# Patient Record
(demographics unavailable — no encounter records)

---

## 2024-11-16 NOTE — HISTORY OF PRESENT ILLNESS
[FreeTextEntry1] : Language: English Accompanied by: Self Contact info: [phone] Referring Provider/PCP: Jarrett Becerril Google referral    Initial H&P 11/13/2024: Mr. LAWRENCE is a very pleasant 27 year-old gentleman who presents today for initial evaluation of Bilateral Orchialgia, R >L.   Saw a urologist 1y ago, was dx with epididymitis, was prescribed doxy, pain resolved, but something felt enlarged internally.    Pain recurred few weeks ago. Was able to obtain doxycycline on his own. Has a few days left. Pain improved slightly, but still present.   Pain is significantly worse when he has urge to void. Pain also exacerbated with jogging and leg elevation.  Better with standing. Standing in one spot and not moving makes it worse. Feels relief after he voids.   Also has slightly increased frequency, but also recently increased his PO fluid intake. Also endorses some increased PVD.  Denies weak stream.   Denies GH, dysuria.   Also feels it in his lower abdomen, and internally in his pelvis.   Deines constipation.   Denies pain with ejaculation or following ejaculation.   +++ stress at work.  --------------------------------------------------------------------------------------------------------------------------------------- PMHx: None PSHx: Nasal Surgery, R Shoulder Surgery SHx: denies x3, phys  FHx: Grandfather - kidney and prostate cancer   All: NKDA --------------------------------------------------------------------------------------------------------------------------------------- Physical Exam: General: NAD, sitting on exam table comfortably HEENT: NCAT, EOMI Resp: breathing comfortably on RA, b/l symm chest rise Cardiac: RRR Abd: SNTND Back: (-) CVAT b/l MSK: EDGARDO w/ FROM Psych: appropriate affect : normal appearing circumcised phallus, meatus WNL, R epididymal head very slightly inflamed, otherwise normal testicular and epididymal exam, very mild left varicocele, b/l vas palpable --------------------------------------------------------------------------------------------------------------------------------------- Results:  --------------------------------------------------------------------------------------------------------------------------------------- A/P: 27M with symptoms c/w PFD, however PE findings c/f R epdiidymitis.   1. Right Epididymitis I explained that his right epididymal head felt slightly inflamed today on exam, c/f epididymitis.  I explained that bilateral orchialgia and pelvic pain are not typically symptoms of unilateral epididymitis, however given that his pain was worse in his right testicle than left, and physical exam was concerning for a mild infection, I recommended that he take a full 14d course of the doxycycline (extra tabs prescribed today).   Additionally, given his age, I recommended repeat GC testing, as the tests performed by Dr. Becerril were done prior to his newest sex partner.   I also recommended he wear tighter scrotal support underwear.   2. Pelvic Pain We had a long discussion today about all of the possible causes of their pelvic pain, including but not limited to infection, trauma, strenuous physical activity, hernias, back pain/nerve compression, constipation/other abdominal etiology, ureteral calculi, and pelvic floor dysfunction.  Based on the patient's history and physical exam findings, we can often narrow down the potential causes, and treatment of the pain is catered towards the underlying cause.   If non-infectious inflammation is suspected, I generally recommend a 2-4 week course of low dose daily NSAIDs in the form of either meloxicam or celecoxib, unless contraindicated.  We discussed that for pain/inflammation, ibuprofen is just as effective, however I am concerned about potential GI AEs with Ibuprofen, which can be significantly minimized with MART-2 specific inhibitors.  AEs were discussed, including but not limited to KELSEY, ATN, Increased risk of bleeding, and cardiac complications. In addition to NSAIDs, I also recommend rest/avoiding strenuous activity that can potentially worsen the pain.  Another common and major cause of pelvic pain can be attributed to pelvic floor dysfunction.  We had a long discussion today about the anatomy of the pelvic floor, and how these muscles can contribute to a multitude of symptoms, oftentimes presenting differently in different patients.  For PFD, we typically recommend PFPT as a start, and if unsuccessful, will discuss other treatment options upon completion of PFPT.  Counseled pt on the importance of compliance with PFPT, and how it is only effective if there is good compliance.   As discussed above, I suspect his pain was due to a combination of both his epididymitis and Pelvic floor dysfunction. As such, I recommended he reassess his symptoms after the doxy, and I also recommended he take Meloxicam for pain and inflammation.  Normally, I would recommend PFPT, however we will monitor for any changes in his symptoms over the next several weeks.  I provided him with a referral, however if he is completely symptom free, and he is unable to go due to scheduling constraints, he may hold off on PT for the time being.   I have answered all of his questions today, and I will see him for f/u in 3-6 months (if going to PT) or sooner PRN.   Plan: - doxy for an additional 4d - meloxicam - scrotal support - f/u GC - PFPT referral - RTC as above  I spent a total of 47 minutes on face-to-face counseling, coordination of care, review of prior records and clinical documentation.

## 2024-11-16 NOTE — HISTORY OF PRESENT ILLNESS
[FreeTextEntry1] : Language: English Accompanied by: Self Contact info: [phone] Referring Provider/PCP: Jarrett Becerril Google referral    Initial H&P 11/13/2024: Mr. LAWRENCE is a very pleasant 27 year-old gentleman who presents today for initial evaluation of Bilateral Orchialgia, R >L.   Saw a urologist 1y ago, was dx with epididymitis, was prescribed doxy, pain resolved, but something felt enlarged internally.    Pain recurred few weeks ago. Was able to obtain doxycycline on his own. Has a few days left. Pain improved slightly, but still present.   Pain is significantly worse when he has urge to void. Pain also exacerbated with jogging and leg elevation.  Better with standing. Standing in one spot and not moving makes it worse. Feels relief after he voids.   Also has slightly increased frequency, but also recently increased his PO fluid intake. Also endorses some increased PVD.  Denies weak stream.   Denies GH, dysuria.   Also feels it in his lower abdomen, and internally in his pelvis.   Deines constipation.   Denies pain with ejaculation or following ejaculation.   +++ stress at work.  --------------------------------------------------------------------------------------------------------------------------------------- PMHx: None PSHx: Nasal Surgery, R Shoulder Surgery SHx: denies x3, phys  FHx: Grandfather - kidney and prostate cancer   All: NKDA --------------------------------------------------------------------------------------------------------------------------------------- Physical Exam: General: NAD, sitting on exam table comfortably HEENT: NCAT, EOMI Resp: breathing comfortably on RA, b/l symm chest rise Cardiac: RRR Abd: SNTND Back: (-) CVAT b/l MSK: EDGARDO w/ FROM Psych: appropriate affect : normal appearing circumcised phallus, meatus WNL, R epididymal head very slightly inflamed, otherwise normal testicular and epididymal exam, very mild left varicocele, b/l vas palpable --------------------------------------------------------------------------------------------------------------------------------------- Results:  --------------------------------------------------------------------------------------------------------------------------------------- A/P: 27M with symptoms c/w PFD, however PE findings c/f R epdiidymitis.   1. Right Epididymitis I explained that his right epididymal head felt slightly inflamed today on exam, c/f epididymitis.  I explained that bilateral orchialgia and pelvic pain are not typically symptoms of unilateral epididymitis, however given that his pain was worse in his right testicle than left, and physical exam was concerning for a mild infection, I recommended that he take a full 14d course of the doxycycline (extra tabs prescribed today).   Additionally, given his age, I recommended repeat GC testing, as the tests performed by Dr. Becerril were done prior to his newest sex partner.   I also recommended he wear tighter scrotal support underwear.   2. Pelvic Pain We had a long discussion today about all of the possible causes of their pelvic pain, including but not limited to infection, trauma, strenuous physical activity, hernias, back pain/nerve compression, constipation/other abdominal etiology, ureteral calculi, and pelvic floor dysfunction.  Based on the patient's history and physical exam findings, we can often narrow down the potential causes, and treatment of the pain is catered towards the underlying cause.   If non-infectious inflammation is suspected, I generally recommend a 2-4 week course of low dose daily NSAIDs in the form of either meloxicam or celecoxib, unless contraindicated.  We discussed that for pain/inflammation, ibuprofen is just as effective, however I am concerned about potential GI AEs with Ibuprofen, which can be significantly minimized with MART-2 specific inhibitors.  AEs were discussed, including but not limited to KELSEY, ATN, Increased risk of bleeding, and cardiac complications. In addition to NSAIDs, I also recommend rest/avoiding strenuous activity that can potentially worsen the pain.  Another common and major cause of pelvic pain can be attributed to pelvic floor dysfunction.  We had a long discussion today about the anatomy of the pelvic floor, and how these muscles can contribute to a multitude of symptoms, oftentimes presenting differently in different patients.  For PFD, we typically recommend PFPT as a start, and if unsuccessful, will discuss other treatment options upon completion of PFPT.  Counseled pt on the importance of compliance with PFPT, and how it is only effective if there is good compliance.   As discussed above, I suspect his pain was due to a combination of both his epididymitis and Pelvic floor dysfunction. As such, I recommended he reassess his symptoms after the doxy, and I also recommended he take Meloxicam for pain and inflammation.  Normally, I would recommend PFPT, however we will monitor for any changes in his symptoms over the next several weeks.  I provided him with a referral, however if he is completely symptom free, and he is unable to go due to scheduling constraints, he may hold off on PT for the time being.   I have answered all of his questions today, and I will see him for f/u in 3-6 months (if going to PT) or sooner PRN.   Plan: - doxy for an additional 4d - meloxicam - scrotal support - f/u GC - PFPT referral - RTC as above  I spent a total of 47 minutes on face-to-face counseling, coordination of care, review of prior records and clinical documentation.